# Patient Record
Sex: FEMALE | URBAN - METROPOLITAN AREA
[De-identification: names, ages, dates, MRNs, and addresses within clinical notes are randomized per-mention and may not be internally consistent; named-entity substitution may affect disease eponyms.]

---

## 2023-12-13 ENCOUNTER — TELEPHONE (OUTPATIENT)
Age: 83
End: 2023-12-13

## 2023-12-13 NOTE — TELEPHONE ENCOUNTER
Azul Choudhury is calling on behalf of Katarina Almendarez.  She wants to discuss prescriptions. Azul Choudhury has called twice.  Please give her a call back.    Preferred call back number ; [phone]

## 2023-12-27 NOTE — TELEPHONE ENCOUNTER
Called and spoke with the pt niece Azul Choudhury, she states Katarina Almendarez the pt has received the 1-month supply of Farxiga. The 1st (3) days were rough but she believes her aunt was just stressed with the insurance company and is tolerating the medication well now, she is doing much better. Informed the her Dr. Mayes will be updated. Asked that she make sure the pt is up to date on her overdue vaccines below. Azul Choudhury voiced understanding.  COVID-19 Vaccine (1)     DTaP/Tdap/Td vaccine (1 - Tdap)     Respiratory Syncytial Virus (RSV) Pregnant or age 60 yrs+ (1 - 1-dose 60+ series)     Flu vaccine (1)

## 2024-01-08 ENCOUNTER — TELEPHONE (OUTPATIENT)
Age: 84
End: 2024-01-08

## 2024-01-08 NOTE — TELEPHONE ENCOUNTER
Received phone message from the patient, stating that she is having side effects from the medication Farxiga. I called her back did not get a response and left a voice message for her to return call to the office for more information.

## 2024-02-28 NOTE — TELEPHONE ENCOUNTER
Called the patient in follow up, and the patient family member answered. The patient stopped the Farxiga as it was causing her nausea and increase problem with constipation. The daughter said that she is doing well and SOB is not worse. They will discuss this with Dr. Mayes at her appt in May.  She did have a consult with GI and they want to do a colonoscopy. They should be sending a paper over about holding her blood thinner.

## 2024-02-29 NOTE — TELEPHONE ENCOUNTER
Daughter called back today just to clarify our conversation yesterday. She is not sure about having the patient do the colonoscopy, but I told her we will complete the form and send it over just incase they decide to do it.  Provider Dr. French. We did receive the fax.

## 2024-03-22 RX ORDER — FLECAINIDE ACETATE 100 MG/1
100 TABLET ORAL 2 TIMES DAILY
COMMUNITY
End: 2024-03-22 | Stop reason: SDUPTHER

## 2024-03-22 RX ORDER — FLECAINIDE ACETATE 100 MG/1
100 TABLET ORAL 2 TIMES DAILY
Qty: 90 TABLET | Refills: 5 | Status: SHIPPED | OUTPATIENT
Start: 2024-03-22

## 2024-03-22 NOTE — PROGRESS NOTES
Refills of flecainide and eliquis sent. Dosages confirmed in [facility] system.    Megan Tomlin PA-C  2:01 PM

## 2024-05-29 ENCOUNTER — OFFICE VISIT (OUTPATIENT)
Age: 84
End: 2024-05-29
Payer: MEDICARE

## 2024-05-29 VITALS
DIASTOLIC BLOOD PRESSURE: 77 MMHG | SYSTOLIC BLOOD PRESSURE: 132 MMHG | BODY MASS INDEX: 29.99 KG/M2 | HEART RATE: 71 BPM | OXYGEN SATURATION: 97 % | WEIGHT: 180 LBS | HEIGHT: 65 IN

## 2024-05-29 DIAGNOSIS — I27.20 PULMONARY HYPERTENSION (HCC): ICD-10-CM

## 2024-05-29 DIAGNOSIS — I10 PRIMARY HYPERTENSION: ICD-10-CM

## 2024-05-29 DIAGNOSIS — I50.32 CHRONIC DIASTOLIC (CONGESTIVE) HEART FAILURE (HCC): ICD-10-CM

## 2024-05-29 DIAGNOSIS — G47.33 OBSTRUCTIVE SLEEP APNEA: ICD-10-CM

## 2024-05-29 DIAGNOSIS — R94.31 ABNORMAL ECG: ICD-10-CM

## 2024-05-29 DIAGNOSIS — I05.9 MITRAL VALVE DISEASE: ICD-10-CM

## 2024-05-29 DIAGNOSIS — Z79.01 LONG TERM (CURRENT) USE OF ANTICOAGULANTS: ICD-10-CM

## 2024-05-29 DIAGNOSIS — R01.1 SYSTOLIC MURMUR: ICD-10-CM

## 2024-05-29 DIAGNOSIS — I48.0 PAROXYSMAL ATRIAL FIBRILLATION (HCC): ICD-10-CM

## 2024-05-29 DIAGNOSIS — R06.02 EXERTIONAL SHORTNESS OF BREATH: Primary | ICD-10-CM

## 2024-05-29 PROCEDURE — 3078F DIAST BP <80 MM HG: CPT | Performed by: INTERNAL MEDICINE

## 2024-05-29 PROCEDURE — 99215 OFFICE O/P EST HI 40 MIN: CPT | Performed by: INTERNAL MEDICINE

## 2024-05-29 PROCEDURE — 1123F ACP DISCUSS/DSCN MKR DOCD: CPT | Performed by: INTERNAL MEDICINE

## 2024-05-29 PROCEDURE — 3075F SYST BP GE 130 - 139MM HG: CPT | Performed by: INTERNAL MEDICINE

## 2024-05-29 RX ORDER — DAPAGLIFLOZIN 10 MG/1
TABLET, FILM COATED ORAL
COMMUNITY
End: 2024-05-29 | Stop reason: ALTCHOICE

## 2024-05-29 RX ORDER — METOPROLOL SUCCINATE 50 MG/1
1 TABLET, EXTENDED RELEASE ORAL
COMMUNITY

## 2024-05-29 RX ORDER — POLYETHYLENE GLYCOL-3350 AND ELECTROLYTES 236; 6.74; 5.86; 2.97; 22.74 G/274.31G; G/274.31G; G/274.31G; G/274.31G; G/274.31G
POWDER, FOR SOLUTION ORAL
COMMUNITY
Start: 2024-04-11

## 2024-05-29 RX ORDER — LOSARTAN POTASSIUM 50 MG/1
1 TABLET ORAL
COMMUNITY
Start: 2023-02-13

## 2024-05-29 RX ORDER — FUROSEMIDE 40 MG/1
TABLET ORAL
COMMUNITY
Start: 2023-02-14

## 2024-05-29 RX ORDER — FUROSEMIDE 20 MG/1
TABLET ORAL
COMMUNITY
Start: 2023-06-29

## 2024-05-29 RX ORDER — FLUTICASONE PROPIONATE 50 MCG
SPRAY, SUSPENSION (ML) NASAL
COMMUNITY

## 2024-05-29 ASSESSMENT — PATIENT HEALTH QUESTIONNAIRE - PHQ9
SUM OF ALL RESPONSES TO PHQ QUESTIONS 1-9: 0
SUM OF ALL RESPONSES TO PHQ QUESTIONS 1-9: 0
1. LITTLE INTEREST OR PLEASURE IN DOING THINGS: NOT AT ALL
SUM OF ALL RESPONSES TO PHQ QUESTIONS 1-9: 0
2. FEELING DOWN, DEPRESSED OR HOPELESS: NOT AT ALL
SUM OF ALL RESPONSES TO PHQ9 QUESTIONS 1 & 2: 0
SUM OF ALL RESPONSES TO PHQ QUESTIONS 1-9: 0

## 2024-05-29 ASSESSMENT — ENCOUNTER SYMPTOMS
SHORTNESS OF BREATH: 1
ALLERGIC/IMMUNOLOGIC NEGATIVE: 1
GASTROINTESTINAL NEGATIVE: 1
EYES NEGATIVE: 1

## 2024-05-29 NOTE — PROGRESS NOTES
Katarina Almendarez (:  1940) is a 83 y.o. female,Established patient, here for evaluation of the following chief complaint(s):  Follow-up (7-Month)    Subjective   SUBJECTIVE/OBJECTIVE:  HPI  Patient presents today for follow-up. 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 As a result, she underwent a MitraClip procedure due to mitral regurgitation. She has paroxysmal atrial fibrillation and underwent cardioversion this year.           Today, she is doing fair but still has issues with increased shortness of breath at times. She states that walking even 1/4 block has her stopping to rest as a result of this increased dyspnea.  She does have evidence of diastolic dysfunction and was placed on Farxiga which the patient states she felt was raising her blood pressure and stopped it on her own.  No palpitations have been noted. She does have a history of bradycardia when she was taking metoprolol and flecainide and I have eliminated the metoprolol dose and she is currently on flecainide.  She has had no evidence of recurrent atrial fibrillation.  No syncope. No orthopnea. No history of known coronary artery disease.           I have carefully reviewed all available medical records, previous office notes, lab, x-ray, and procedure reports    Past Medical History:   Diagnosis Date    CHF (congestive heart failure) (HCC)     Hypertension     ADRIANA (obstructive sleep apnea)     Paroxysmal atrial fibrillation (HCC)         No past surgical history on file.     Allergies   Allergen Reactions    Celecoxib      Other Reaction(s): other/intolerance    Lightheadedness and palpitations    Penicillins      Other Reaction(s): rash/itching        Current Outpatient Medications   Medication Sig Dispense Refill    furosemide (LASIX) 20 MG tablet Take 20 mg qod take 40 mg qod      losartan (COZAAR) 50 MG tablet Take 1 tablet by mouth Every Day      empagliflozin (JARDIANCE) 10 MG tablet Take 1

## 2024-09-25 RX ORDER — FLECAINIDE ACETATE 100 MG/1
100 TABLET ORAL 2 TIMES DAILY
Qty: 180 TABLET | Refills: 3 | Status: SHIPPED | OUTPATIENT
Start: 2024-09-25

## 2024-10-01 ENCOUNTER — TELEPHONE (OUTPATIENT)
Age: 84
End: 2024-10-01

## 2024-10-01 NOTE — TELEPHONE ENCOUNTER
Daughter Azul izaguirre called with some concerns about her mothers medication. Mrs. Izaguirre said that her mother is taking Jardiance and it is making hr mother really sick.    can be reached at [phone] or [phone]

## 2024-10-02 NOTE — TELEPHONE ENCOUNTER
Called and spoke with Katarina Almendarez niece-Azul , two pt identifiers verified, name and .     Informed her MONY Tomlin  need more information regarding what symptoms they feel Jardiance is causing the patient.  Please call them and ask for details regarding this so that I can make further recommendations.     Azul stated the patient has not been taking medication for the past 2 months because it's causing rapid heart rate,some dizziness and disorient. When she last seen Dr. Mayes he stated he wanted her to start back taking them she did but then she stop. She stated she can not really say how it is making her feel but she know's THOMPSON Almendarez is not her normal self and just not feeling well.

## 2024-10-14 NOTE — TELEPHONE ENCOUNTER
Call and spoke with Georgina, identified by Name/; informed her PANabor stated she does not think her symtoms was beacause of medication because it will not change her heart rate but would help improve with SOB. I also informed her that for the pain she stated she was having , she could take tylenol. Patient stated  and another provider told her she cound not take tylenol. THOMPSON SYKES looke patricia her lab work as well as asked the patient if she had any Liver problems. She stated \"NO\" so Nabor informed her it was okay for her to do so.

## 2024-10-14 NOTE — TELEPHONE ENCOUNTER
Spoke with Georgina, identified by name/. She stated she has stop taking medication and she noticed since she stopped the SOB has improved a lot as well as her heart rate has been in the 60's. She stated she has Osteoporosis and has been in a lot of pain and wants to know what can she take to ease the pain a little. She stated the pharmacist was trying to get her to take Aspirin and she knows she can not.

## 2024-12-18 ENCOUNTER — OFFICE VISIT (OUTPATIENT)
Age: 84
End: 2024-12-18
Payer: MEDICARE

## 2024-12-18 VITALS
OXYGEN SATURATION: 100 % | TEMPERATURE: 95.2 F | HEART RATE: 66 BPM | HEIGHT: 65 IN | RESPIRATION RATE: 18 BRPM | SYSTOLIC BLOOD PRESSURE: 142 MMHG | DIASTOLIC BLOOD PRESSURE: 91 MMHG | WEIGHT: 180 LBS | BODY MASS INDEX: 29.99 KG/M2

## 2024-12-18 DIAGNOSIS — I05.9 MITRAL VALVE DISEASE: ICD-10-CM

## 2024-12-18 DIAGNOSIS — I10 PRIMARY HYPERTENSION: ICD-10-CM

## 2024-12-18 DIAGNOSIS — I27.20 PULMONARY HYPERTENSION (HCC): ICD-10-CM

## 2024-12-18 DIAGNOSIS — I48.0 PAROXYSMAL ATRIAL FIBRILLATION (HCC): ICD-10-CM

## 2024-12-18 DIAGNOSIS — I50.32 CHRONIC DIASTOLIC (CONGESTIVE) HEART FAILURE (HCC): ICD-10-CM

## 2024-12-18 DIAGNOSIS — R94.31 ABNORMAL ECG: ICD-10-CM

## 2024-12-18 DIAGNOSIS — Z79.01 LONG TERM (CURRENT) USE OF ANTICOAGULANTS: ICD-10-CM

## 2024-12-18 DIAGNOSIS — G47.33 OBSTRUCTIVE SLEEP APNEA: ICD-10-CM

## 2024-12-18 DIAGNOSIS — R01.1 SYSTOLIC MURMUR: ICD-10-CM

## 2024-12-18 DIAGNOSIS — R06.02 EXERTIONAL SHORTNESS OF BREATH: Primary | ICD-10-CM

## 2024-12-18 PROCEDURE — 93000 ELECTROCARDIOGRAM COMPLETE: CPT | Performed by: INTERNAL MEDICINE

## 2024-12-18 PROCEDURE — 1126F AMNT PAIN NOTED NONE PRSNT: CPT | Performed by: INTERNAL MEDICINE

## 2024-12-18 PROCEDURE — 1159F MED LIST DOCD IN RCRD: CPT | Performed by: INTERNAL MEDICINE

## 2024-12-18 PROCEDURE — 1123F ACP DISCUSS/DSCN MKR DOCD: CPT | Performed by: INTERNAL MEDICINE

## 2024-12-18 PROCEDURE — 99215 OFFICE O/P EST HI 40 MIN: CPT | Performed by: INTERNAL MEDICINE

## 2024-12-18 RX ORDER — ACETAMINOPHEN 160 MG
TABLET,DISINTEGRATING ORAL DAILY
COMMUNITY
Start: 2024-11-20

## 2024-12-18 ASSESSMENT — PATIENT HEALTH QUESTIONNAIRE - PHQ9
SUM OF ALL RESPONSES TO PHQ QUESTIONS 1-9: 0
SUM OF ALL RESPONSES TO PHQ QUESTIONS 1-9: 0
SUM OF ALL RESPONSES TO PHQ9 QUESTIONS 1 & 2: 0
1. LITTLE INTEREST OR PLEASURE IN DOING THINGS: NOT AT ALL
SUM OF ALL RESPONSES TO PHQ QUESTIONS 1-9: 0
SUM OF ALL RESPONSES TO PHQ QUESTIONS 1-9: 0
2. FEELING DOWN, DEPRESSED OR HOPELESS: NOT AT ALL

## 2024-12-18 ASSESSMENT — ENCOUNTER SYMPTOMS
ALLERGIC/IMMUNOLOGIC NEGATIVE: 1
SHORTNESS OF BREATH: 1
GASTROINTESTINAL NEGATIVE: 1
EYES NEGATIVE: 1

## 2024-12-18 NOTE — PROGRESS NOTES
1. \"Have you been to the ER, urgent care clinic since your last visit?  Hospitalized since your last visit?\" Reviewed by Dr. Brooks Mayes     2. \"Have you seen or consulted any other health care providers outside of the [facility] since your last visit?\" Reviewed by Dr. Brooks Mayes

## 2024-12-18 NOTE — PROGRESS NOTES
Katarina Almendarez (:  1940) is a 84 y.o. female,Established patient, here for evaluation of the following chief complaint(s):  Follow-up (7 Month Follow Up )    Subjective   SUBJECTIVE/OBJECTIVE:  History of Present Illness  The patient presents for evaluation of shortness of breath, primary hypertension, paroxysmal atrial fibrillation, sleep apnea, and long-term use of anticoagulants. 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 As a result, she underwent a MitraClip procedure due to mitral regurgitation. She has paroxysmal atrial fibrillation and underwent cardioversion last year.    She reports experiencing edema in her lower extremities, which has been a persistent issue for the past 2 to 3 weeks.    Additionally, she experiences dyspnea on exertion, even with minimal physical activity such as walking across a room. Her cardiac rhythm was evaluated yesterday and found to be within normal limits.    She occasionally monitors her blood pressure at home, noting fluctuations in her readings. A recent dental visit revealed a blood pressure reading of 99/62.    She has a diagnosis of sleep apnea and is compliant with nightly CPAP therapy.    She is currently on Eliquis as part of her anticoagulation regimen and adheres to this medication daily.    MEDICATIONS  Current: Eliquis    I have carefully reviewed all available medical records, previous office notes, lab, x-ray and procedure reports    Past Medical History:   Diagnosis Date    CHF (congestive heart failure) (HCC)     Hypertension     ADRIANA (obstructive sleep apnea)     Paroxysmal atrial fibrillation (HCC)         History reviewed. No pertinent surgical history.     Allergies   Allergen Reactions    Celecoxib      Other Reaction(s): other/intolerance    Lightheadedness and palpitations    Penicillins      Other Reaction(s): rash/itching        Current Outpatient Medications   Medication Sig Dispense Refill

## 2024-12-23 LAB
BUN SERPL-MCNC: 15 MG/DL (ref 8–27)
BUN/CREAT SERPL: 15 (ref 12–28)
CALCIUM SERPL-MCNC: 9.5 MG/DL (ref 8.7–10.3)
CHLORIDE SERPL-SCNC: 102 MMOL/L (ref 96–106)
CO2 SERPL-SCNC: 24 MMOL/L (ref 20–29)
CREAT SERPL-MCNC: 0.99 MG/DL (ref 0.57–1)
EGFRCR SERPLBLD CKD-EPI 2021: 56 ML/MIN/1.73
GLUCOSE SERPL-MCNC: 97 MG/DL (ref 70–99)
NT-PROBNP SERPL-MCNC: 1347 PG/ML (ref 0–738)
POTASSIUM SERPL-SCNC: 4.5 MMOL/L (ref 3.5–5.2)
SODIUM SERPL-SCNC: 139 MMOL/L (ref 134–144)

## 2025-01-03 RX ORDER — FUROSEMIDE 40 MG/1
40 TABLET ORAL DAILY
Qty: 90 TABLET | Refills: 3 | Status: CANCELLED | OUTPATIENT
Start: 2025-01-03

## 2025-01-03 NOTE — PROGRESS NOTES
PCP: Sandrine Miller DO    Last appt:  Visit date not found   Future Appointments   Date Time Provider Department Center   1/28/2025  8:30 AM [facility]   8/6/2025  9:45 AM Brooks Mayes Sr., MD [facility]       Requested Prescriptions     Pending Prescriptions Disp Refills    furosemide (LASIX) 40 MG tablet 90 tablet 3     Sig: Take 1 tablet by mouth daily       Request for a 30 or 90 day supply? Provider Discretion    Pharmacy: confirmed    Other Comments:

## 2025-02-06 ENCOUNTER — TELEPHONE (OUTPATIENT)
Age: 85
End: 2025-02-06

## 2025-02-06 NOTE — TELEPHONE ENCOUNTER
From Office visit note on 12/18/25  1. Shortness of breath.  The etiology of the dyspnea appears to be multifactorial, with a potential contribution from cardiac insufficiency. Laboratory tests, including B-type natriuretic peptide and a basic metabolic profile, are recommended. Medication adjustments may be necessary to manage fluid overload and alleviate dyspnea. A repeat echocardiogram is also suggested to evaluate for any wall motion abnormalities or decreased left ventricular function, which could be indicative of a stiff ventricle, a condition that can be managed pharmacologically.    Patient calling as she wants the results of her Echo and wants to know if Dr. Mayes is going to change any of her meds.  She did have 2 wks of Jardiance samples, but said she ended up with Bronchitis, so she can not tell whether or not it was helpful for her SOB.     Please advise.

## 2025-03-03 NOTE — TELEPHONE ENCOUNTER
Called patient today in follow up to taking 2 weeks of Jardiance samples.   Called home number, no answer. Left voice message for a return call.  Called mobile number, no answer and I left a voice message for a return call.

## 2025-03-06 DIAGNOSIS — I50.32 CHRONIC DIASTOLIC (CONGESTIVE) HEART FAILURE (HCC): ICD-10-CM

## 2025-03-06 NOTE — TELEPHONE ENCOUNTER
Reviewed with THOMPSON TRINIDAD. I called the patient back and advised her to start the samples when she is feeling better after her bronchitis. I also asked her to call the office and let us know if she had improvement in her sob.
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
## 2025-03-06 NOTE — TELEPHONE ENCOUNTER
PCP: Sandrine Miller,     Last appt:  12/18/2024   Future Appointments   Date Time Provider Department Center   8/6/2025  9:45 AM Brooks Mayes Sr., MD [facility]       Requested Prescriptions     Pending Prescriptions Disp Refills    empagliflozin (JARDIANCE) 10 MG tablet 30 tablet 5     Sig: Take 1 tablet by mouth daily       Request for a 30 day supply? Provider Discretion    Pharmacy: correct in system per patient.    Other Comments:   Patient tried samples and sob did improve.

## 2025-03-21 NOTE — TELEPHONE ENCOUNTER
PCP: Sandrine Miller,     Last appt:  Visit date not found   Future Appointments   Date Time Provider Department Center   8/6/2025  9:45 AM Brooks Mayes Sr., MD [facility]       Requested Prescriptions     Pending Prescriptions Disp Refills    apixaban (ELIQUIS) 5 MG TABS tablet [Pharmacy Med Name: ELIQUIS 5 MG TABLET] 180 tablet 3     Sig: TAKE 1 TABLET BY MOUTH TWICE A DAY       Request for a 30 or 90 day supply? Provider Discretion    Pharmacy: CONFIRMED    Other Comments: N/A

## 2025-03-25 RX ORDER — APIXABAN 5 MG/1
5 TABLET, FILM COATED ORAL 2 TIMES DAILY
Qty: 180 TABLET | Refills: 3 | Status: SHIPPED | OUTPATIENT
Start: 2025-03-25

## 2025-08-06 ENCOUNTER — OFFICE VISIT (OUTPATIENT)
Age: 85
End: 2025-08-06
Payer: MEDICARE

## 2025-08-06 VITALS
DIASTOLIC BLOOD PRESSURE: 87 MMHG | OXYGEN SATURATION: 98 % | HEART RATE: 87 BPM | WEIGHT: 183 LBS | SYSTOLIC BLOOD PRESSURE: 119 MMHG | BODY MASS INDEX: 30.49 KG/M2 | HEIGHT: 65 IN

## 2025-08-06 DIAGNOSIS — I48.0 PAROXYSMAL ATRIAL FIBRILLATION (HCC): ICD-10-CM

## 2025-08-06 DIAGNOSIS — I36.1 NONRHEUMATIC TRICUSPID VALVE REGURGITATION: ICD-10-CM

## 2025-08-06 DIAGNOSIS — R01.1 SYSTOLIC MURMUR: ICD-10-CM

## 2025-08-06 DIAGNOSIS — R06.02 EXERTIONAL SHORTNESS OF BREATH: Primary | ICD-10-CM

## 2025-08-06 DIAGNOSIS — G47.33 OBSTRUCTIVE SLEEP APNEA: ICD-10-CM

## 2025-08-06 DIAGNOSIS — I10 PRIMARY HYPERTENSION: ICD-10-CM

## 2025-08-06 DIAGNOSIS — I50.32 CHRONIC DIASTOLIC (CONGESTIVE) HEART FAILURE (HCC): ICD-10-CM

## 2025-08-06 DIAGNOSIS — I27.20 PULMONARY HYPERTENSION (HCC): ICD-10-CM

## 2025-08-06 DIAGNOSIS — R94.31 ABNORMAL ECG: ICD-10-CM

## 2025-08-06 DIAGNOSIS — Z79.01 LONG TERM (CURRENT) USE OF ANTICOAGULANTS: ICD-10-CM

## 2025-08-06 DIAGNOSIS — I05.9 MITRAL VALVE DISEASE: ICD-10-CM

## 2025-08-06 PROCEDURE — 1123F ACP DISCUSS/DSCN MKR DOCD: CPT | Performed by: INTERNAL MEDICINE

## 2025-08-06 PROCEDURE — 1160F RVW MEDS BY RX/DR IN RCRD: CPT | Performed by: INTERNAL MEDICINE

## 2025-08-06 PROCEDURE — 99215 OFFICE O/P EST HI 40 MIN: CPT | Performed by: INTERNAL MEDICINE

## 2025-08-06 PROCEDURE — 1126F AMNT PAIN NOTED NONE PRSNT: CPT | Performed by: INTERNAL MEDICINE

## 2025-08-06 PROCEDURE — 1159F MED LIST DOCD IN RCRD: CPT | Performed by: INTERNAL MEDICINE

## 2025-08-06 ASSESSMENT — PATIENT HEALTH QUESTIONNAIRE - PHQ9
1. LITTLE INTEREST OR PLEASURE IN DOING THINGS: NOT AT ALL
SUM OF ALL RESPONSES TO PHQ QUESTIONS 1-9: 0
2. FEELING DOWN, DEPRESSED OR HOPELESS: NOT AT ALL
SUM OF ALL RESPONSES TO PHQ QUESTIONS 1-9: 0

## 2025-08-06 ASSESSMENT — ENCOUNTER SYMPTOMS
GASTROINTESTINAL NEGATIVE: 1
SHORTNESS OF BREATH: 1
EYES NEGATIVE: 1
ALLERGIC/IMMUNOLOGIC NEGATIVE: 1